# Patient Record
Sex: FEMALE | Race: WHITE | HISPANIC OR LATINO | URBAN - METROPOLITAN AREA
[De-identification: names, ages, dates, MRNs, and addresses within clinical notes are randomized per-mention and may not be internally consistent; named-entity substitution may affect disease eponyms.]

---

## 2018-03-27 NOTE — PATIENT DISCUSSION
POAG, OU:  ELEVATED INTRAOCULAR PRESSURE. PRESCRIBE _LATANOPROST QHS OU________. RETURN FOR FOLLOW-UP AS SCHEDULED.

## 2019-04-23 NOTE — PATIENT DISCUSSION
Glaucoma / iStent Counseling:  I have explained to the patient at length the diagnosis of primary open angle glaucoma and its pathophysiology. I have discussed the various treatment options including medications and surgery. I have discussed the option of iStent micro-invasive glaucoma surgery device at the time of cataract surgery should visual field testing indicate field loss. I have discussed the risks and benefits associated with iStent insertion at the time of cataract surgery. Patient understands and wishes to have an Istent inserted to try and get off drops secondary to poor compliance, cost and/or reaction to drops.

## 2019-04-23 NOTE — PATIENT DISCUSSION
POAG, OU: BORDERLINE INTRAOCULAR PRESSURE. STOP_LATANOPROST QHS OU AND START TIMOLOL QAM OU__. RETURN FOR FOLLOW-UP AS SCHEDULED.

## 2019-04-23 NOTE — PATIENT DISCUSSION
New Prescription: timolol maleate (timolol maleate): gel forming solution: 0.5% 1 drop every morning into both eyes 04-

## 2019-04-23 NOTE — PATIENT DISCUSSION
Primary Open Angle Glaucoma Counseling: I have explained to the patient at length regarding the diagnosis of primary open angle glaucoma and its pathophysiology. I have discussed the various treatment options including medications and surgery. I recommend that the patient begin medical treatment. I emphasized to the patient the importance of compliance with treatment and follow-up appointments.

## 2019-04-23 NOTE — PATIENT DISCUSSION
Stopped Today: latanoprost (latanoprost): drops: 0.005% 1 drop at bedtime as directed into both eyes 03-

## 2019-06-24 NOTE — PATIENT DISCUSSION
Continue: timolol maleate (timolol maleate): gel forming solution: 0.5% 1 drop every morning into both eyes 04-

## 2019-06-24 NOTE — PATIENT DISCUSSION
CATARACTS, OU - VISUALLY SIGNIFICANT. SCHEDULE _OS_ FIRST THEN LATER IN _OD_ DISCUSSED OPTION OF __standard iol___VS __standard with lensx ou____. PATIENT UNDERSTANDS AND DESIRES _to think about and decide prior to surgery.  _.

## 2019-07-19 NOTE — PATIENT DISCUSSION
S/P PE IOL, OS. STABLE. CONTINUE DROPS AS DIRECTED. RETURN FOR FOLLOW-UP IN 7- 10 DAYS. Skin normal color for race, warm, dry and intact. No evidence of rash.

## 2019-07-29 NOTE — PATIENT DISCUSSION
S/P PE IOL, OS. DOING WELL. CONTINUE PRED-GATI-BROM IN THE SURGICAL EYE  FOR A TOTAL OF 3 WEEKS USE THEN DISCONTINUE. PATIENT DESIRES _STD______IOL FOR 2ND EYE. SCHEDULE CATARACT SURGERY.

## 2019-07-29 NOTE — PATIENT DISCUSSION
Pre-Op 2nd Eye Counseling: The patient has noticed an improvement in their visual symptoms in the operative eye. The patient complains of decreased vision in the fellow eye when __driving. It was explained to the patient that the decision to proceed with cataract surgery in the fellow eye is entirely a separate decision from the surgical eye. All of the same risks, benefits and alternatives are reviewed with the patient again. The patient does feel the vision in the non-operative eye is limiting their daily activities and elects to proceed with cataract surgery in the _right eye. . I have given the patient the prescribed regimen of  drops to use before and after cataract surgery. Patient to administer as directed.

## 2019-08-02 NOTE — PATIENT DISCUSSION
New Prescription: erythromycin (erythromycin): ointment: 5 mg/gram (0.5 %) a small amount once a day into right eye 08-

## 2019-08-05 NOTE — PATIENT DISCUSSION
Continue: erythromycin (erythromycin): ointment: 5 mg/gram (0.5 %) a small amount once a day into right eye 08-

## 2019-08-05 NOTE — PATIENT DISCUSSION
CORNEAL ABRASION: OD, RESOLVED, PRESCRIBE EMYCIN JAZMYN QHS. PT EDUCATION. RTO AS SCHEDULED. MONITOR.

## 2019-12-16 NOTE — PATIENT DISCUSSION
POAG, OU:  INTRAOCULAR PRESSURE IS WITHIN ACCEPTABLE LIMITS. PATIENT INSTRUCTED TO CONTINUE _LATANOPROST QHS OU_______ AND RETURN FOR FOLLOW-UP AS SCHEDULED.

## 2021-02-18 ENCOUNTER — IMPORTED ENCOUNTER (OUTPATIENT)
Dept: URBAN - METROPOLITAN AREA CLINIC 31 | Facility: CLINIC | Age: 26
End: 2021-02-18

## 2021-02-18 PROBLEM — H18.623: Noted: 2021-02-18

## 2021-02-18 PROCEDURE — 99204 OFFICE O/P NEW MOD 45 MIN: CPT

## 2021-02-18 PROCEDURE — 92025 CPTRIZED CORNEAL TOPOGRAPHY: CPT

## 2021-02-18 PROCEDURE — 92015 DETERMINE REFRACTIVE STATE: CPT

## 2021-02-18 NOTE — PATIENT DISCUSSION
Keratoconus can be caused by eye rubbing or can be inherited. Explained importance of no eye rubbing to prevent progression. Condition is worsening. Treatment options discussed include glasses contact lens  and collagen cross linking. David Done today OU. New Glasses rx given todayReturn for an appointment for cornea evaluation. with Dr. Tg Post. For possible collagen crosslinking. Return for an appointment with Dr. Colni Levi. for Contact Lens Fit to try prior to Consult w/ FEP.

## 2021-04-27 ENCOUNTER — IMPORTED ENCOUNTER (OUTPATIENT)
Dept: URBAN - METROPOLITAN AREA CLINIC 31 | Facility: CLINIC | Age: 26
End: 2021-04-27

## 2021-04-27 PROBLEM — H18.603: Noted: 2021-04-27

## 2021-04-27 PROBLEM — H18.621: Noted: 2021-04-27

## 2021-04-27 PROBLEM — H18.602: Noted: 2021-04-27

## 2021-04-27 PROBLEM — H18.623: Noted: 2021-04-27

## 2021-04-27 PROCEDURE — 92025 CPTRIZED CORNEAL TOPOGRAPHY: CPT

## 2021-04-27 PROCEDURE — 99213 OFFICE O/P EST LOW 20 MIN: CPT

## 2021-04-27 NOTE — PATIENT DISCUSSION
Keratoconus OS -  can be caused by eye rubbing or can be inherited. Explained importance of no eye rubbing to prevent progression.   Treatment options discussed

## 2021-04-27 NOTE — PATIENT DISCUSSION
Keratoconus can be caused by eye rubbing or can be inherited. Explained importance of no eye rubbing to prevent progression. Condition is worsening. Treatment options discussed include glasses contact lens  and collagen cross linking. Risks and benefits of procedure reviewed including infection and haze. Schedule CCLx OD/OS 1-2 months apartAllergies topical pataday or zerviate sample given

## 2021-05-21 ENCOUNTER — IMPORTED ENCOUNTER (OUTPATIENT)
Dept: URBAN - METROPOLITAN AREA CLINIC 31 | Facility: CLINIC | Age: 26
End: 2021-05-21

## 2021-05-21 PROBLEM — H18.621: Noted: 2021-05-21

## 2021-05-21 PROCEDURE — 92071 CONTACT LENS FITTING FOR TX: CPT

## 2021-05-21 NOTE — PATIENT DISCUSSION
Drops reviewed w/ Pt. & Copy of Drop instructions to Pt. Pt Has Prolensa Prednisolone and Moxifloxicin.

## 2021-05-21 NOTE — PATIENT DISCUSSION
1.  Collagen cross linking OD: procedure performed without difficulty. (For Keratoconus) BCL placed. Postop drops and instruction sheet reviewed with patient. Call with any problems. Postop visit in 4-7 days2. Drops reviewed w/ Pt. & Copy of Drop instructions to Pt. Pt Has Prolensa Prednisolone and Moxifloxicin.

## 2021-05-26 ENCOUNTER — IMPORTED ENCOUNTER (OUTPATIENT)
Dept: URBAN - METROPOLITAN AREA CLINIC 31 | Facility: CLINIC | Age: 26
End: 2021-05-26

## 2021-05-26 PROBLEM — Z98.89: Noted: 2021-05-26

## 2021-05-26 PROCEDURE — 99024 POSTOP FOLLOW-UP VISIT: CPT

## 2021-05-26 NOTE — PATIENT DISCUSSION
Post Operative: Doing well po drops as instructed. tears prn Call with any problems. DC BCL moxi and prolensa taper PRED to 2x/d. Return for an appointment in 3 weeks for post op exam. MRx and Topography. OD with Dr. Tg Post.

## 2021-06-16 ENCOUNTER — IMPORTED ENCOUNTER (OUTPATIENT)
Dept: URBAN - METROPOLITAN AREA CLINIC 31 | Facility: CLINIC | Age: 26
End: 2021-06-16

## 2021-06-16 PROCEDURE — 92025 CPTRIZED CORNEAL TOPOGRAPHY: CPT

## 2021-06-16 PROCEDURE — 99024 POSTOP FOLLOW-UP VISIT: CPT

## 2021-09-29 ENCOUNTER — IMPORTED ENCOUNTER (OUTPATIENT)
Dept: URBAN - METROPOLITAN AREA CLINIC 31 | Facility: CLINIC | Age: 26
End: 2021-09-29

## 2021-09-29 PROBLEM — H18.601: Noted: 2021-09-29

## 2021-09-29 PROBLEM — H18.622: Noted: 2021-09-29

## 2021-09-29 PROCEDURE — 99213 OFFICE O/P EST LOW 20 MIN: CPT

## 2021-09-29 PROCEDURE — 92025 CPTRIZED CORNEAL TOPOGRAPHY: CPT

## 2021-09-29 PROCEDURE — 92015 DETERMINE REFRACTIVE STATE: CPT

## 2021-09-29 NOTE — PATIENT DISCUSSION
Keratoconus can be caused by eye rubbing or can be inherited. Explained importance of no eye rubbing to prevent progression. Condition is worsening os. Treatment options discussed include glasses contact lens  and collagen cross linking. Risks and benefits of procedure reviewed including infection and haze.  Schedule CCLx OS once fit with CL OD

## 2021-09-29 NOTE — PATIENT DISCUSSION
1. Keratoconus OD -  can be caused by eye rubbing or can be inherited. Explained importance of no eye rubbing to prevent progression. s/p CCLx improving option of glasses update of CL to correct residual refractive error. Pt would like to try CL schedule fit with Dr Daryn Way. 2. Keratoconus can be caused by eye rubbing or can be inherited. Explained importance of no eye rubbing to prevent progression. Condition is worsening os. Treatment options discussed include glasses contact lens  and collagen cross linking. Risks and benefits of procedure reviewed including infection and haze.  Schedule CCLx OS once fit with CL OD

## 2021-10-14 PROBLEM — Z98.89: Noted: 2021-10-14

## 2021-10-26 ENCOUNTER — IMPORTED ENCOUNTER (OUTPATIENT)
Dept: URBAN - METROPOLITAN AREA CLINIC 31 | Facility: CLINIC | Age: 26
End: 2021-10-26

## 2021-10-26 PROBLEM — H18.621: Noted: 2021-10-26

## 2021-10-26 PROBLEM — H18.601: Noted: 2021-10-26

## 2021-10-26 PROCEDURE — 92072 FITG C-LENS KERATOCONUS 1ST: CPT

## 2021-10-26 NOTE — PATIENT DISCUSSION
Keratoconus OD -  can be caused by eye rubbing or can be inherited. Explained importance of no eye rubbing to prevent progression.   Treatment options discussed

## 2021-10-26 NOTE — PATIENT DISCUSSION
1.  Collagen cross linking procedure performed without difficulty. BCL placed. Postop drops and instruction sheet reviewed with patient. Call with any problems. Postop visit in 4-7 days2. Keratoconus OD -  can be caused by eye rubbing or can be inherited. Explained importance of no eye rubbing to prevent progression. Treatment options discussedOrder trial/final lens OD. Call when in.

## 2021-12-08 ENCOUNTER — IMPORTED ENCOUNTER (OUTPATIENT)
Dept: URBAN - METROPOLITAN AREA CLINIC 31 | Facility: CLINIC | Age: 26
End: 2021-12-08

## 2021-12-08 PROBLEM — H18.601: Noted: 2021-12-08

## 2021-12-08 PROBLEM — H18.621: Noted: 2021-12-08

## 2021-12-08 PROCEDURE — 92072 FITG C-LENS KERATOCONUS 1ST: CPT

## 2021-12-08 NOTE — PATIENT DISCUSSION
Pt did I&R today will wear lens for 1-2 weeks and return for CL check. We can do over MRX at that time to see if we need to do any power change.

## 2021-12-15 ENCOUNTER — IMPORTED ENCOUNTER (OUTPATIENT)
Dept: URBAN - METROPOLITAN AREA CLINIC 31 | Facility: CLINIC | Age: 26
End: 2021-12-15

## 2021-12-15 PROBLEM — H18.601: Noted: 2021-12-15

## 2022-01-05 ENCOUNTER — IMPORTED ENCOUNTER (OUTPATIENT)
Dept: URBAN - METROPOLITAN AREA CLINIC 31 | Facility: CLINIC | Age: 27
End: 2022-01-05

## 2022-01-05 PROBLEM — H18.622: Noted: 2022-01-05

## 2022-01-05 PROCEDURE — 92071 CONTACT LENS FITTING FOR TX: CPT

## 2022-01-05 NOTE — PATIENT DISCUSSION
Collagen cross linking procedure performed OS without difficulty. BCL placed. Postop drops and instruction sheet reviewed with patient. Call with any problems.   Postop visit in 4-7 days

## 2022-01-10 ENCOUNTER — IMPORTED ENCOUNTER (OUTPATIENT)
Dept: URBAN - METROPOLITAN AREA CLINIC 31 | Facility: CLINIC | Age: 27
End: 2022-01-10

## 2022-01-10 PROBLEM — Z98.89: Noted: 2022-01-10

## 2022-01-10 PROCEDURE — 99024 POSTOP FOLLOW-UP VISIT: CPT

## 2022-01-10 NOTE — PATIENT DISCUSSION
1.  Post Operative s/p 1/5/2022  [facility] Cross Link OS  (-5 DAYS POST OP): Doing well po drops as instructed tears prn. Call with any problems. 2. Return for an appointment in 3 weeks for post op exam. with Dr. Jorge A Chang.

## 2022-02-04 ENCOUNTER — IMPORTED ENCOUNTER (OUTPATIENT)
Dept: URBAN - METROPOLITAN AREA CLINIC 31 | Facility: CLINIC | Age: 27
End: 2022-02-04

## 2022-02-04 PROBLEM — Z98.89: Noted: 2022-02-04

## 2022-02-04 PROCEDURE — 99024 POSTOP FOLLOW-UP VISIT: CPT

## 2022-02-04 NOTE — PATIENT DISCUSSION
Post Operative:  Doing well po drops as instructed tears prn. Call with any problems. PRED 2x/d for 2 weeks then qd. Return for an appointment in 6 weeks for post op exam. MRx and Topography. with Dr. Cori Silverman.

## 2022-04-02 ASSESSMENT — VISUAL ACUITY
OD_SC: 20/60
OU_SC: 20/20-1
OD_SC: 20/50-1
OD_PH: CC 20/40
OD_PH: CC 20/40 -2
OD_SC: 20/30+3
OD_SC: 20/60
OS_CC: 20/50
OS_SC: 20/30-1
OD_SC: 20/60-2
OD_PH: CC 20/30 -1
OD_PH: CC 20/30 +1
OS_PH: SC 20/25
OD_SC: 20/25
OD_CC: 20/50+2
OS_PH: CC 20/25 -2
OS_SC: 20/30-1
OD_CC: 20/60
OU_SC: 20/25-1
OS_SC: 20/25-1
OD_SC: 20/40
OD_PH: 20/25 -3
OD_CC: 20/200
OD_PH: CC 20/50
OS_SC: 20/30-3
OS_PH: 20/40 +1
OS_SC: 20/25-1
OU_CC: 20/30
OS_SC: 20/40
OD_PH: SC 20/30
OD_SC: 20/60+1
OS_CC: 20/80-1
OS_SC: 20/40+2
OS_CC: 20/50-1

## 2022-04-02 ASSESSMENT — TONOMETRY
OS_IOP_MMHG: 13
OD_IOP_MMHG: 14
OS_IOP_MMHG: 12
OD_IOP_MMHG: 14

## 2022-04-04 ENCOUNTER — ESTABLISHED PATIENT (OUTPATIENT)
Dept: URBAN - METROPOLITAN AREA CLINIC 31 | Facility: CLINIC | Age: 27
End: 2022-04-04

## 2022-04-04 DIAGNOSIS — H18.619: ICD-10-CM

## 2022-04-04 PROCEDURE — 92072 FITG C-LENS KERATOCONUS 1ST: CPT

## 2022-04-04 ASSESSMENT — VISUAL ACUITY
OD_PH: 20/25-1
OS_PH: 20/25
OD_CC: 20/25
OD_SC: 20/80-2
OS_SC: 20/40

## 2022-04-18 ENCOUNTER — ESTABLISHED PATIENT (OUTPATIENT)
Dept: URBAN - METROPOLITAN AREA CLINIC 31 | Facility: CLINIC | Age: 27
End: 2022-04-18

## 2022-04-18 DIAGNOSIS — Z46.0: ICD-10-CM

## 2022-04-18 DIAGNOSIS — H18.613: ICD-10-CM

## 2022-04-18 PROCEDURE — 92072 FITG C-LENS KERATOCONUS 1ST: CPT

## 2022-04-18 ASSESSMENT — VISUAL ACUITY
OS_CC: 20/25
OD_CC: 20/25

## 2022-05-18 ENCOUNTER — CONTACT LENSES/GLASSES VISIT (OUTPATIENT)
Dept: URBAN - METROPOLITAN AREA CLINIC 31 | Facility: CLINIC | Age: 27
End: 2022-05-18

## 2022-05-18 DIAGNOSIS — Z46.0: ICD-10-CM

## 2022-05-18 PROCEDURE — 92310F

## 2022-05-18 ASSESSMENT — VISUAL ACUITY
OS_CC: 20/30
OD_CC: 20/40

## 2022-05-18 NOTE — PATIENT DISCUSSION
Post Operative:  Doing well po drops as instructed tears prn. Call with any problems. PRED 2x/d for 2 weeks then qd. Return for an appointment in 6 weeks for post op exam. MRx and Topography. with Dr. Kyle Silva.

## 2022-06-07 ENCOUNTER — CONTACT LENSES/GLASSES VISIT (OUTPATIENT)
Dept: URBAN - METROPOLITAN AREA CLINIC 31 | Facility: CLINIC | Age: 27
End: 2022-06-07

## 2022-06-07 DIAGNOSIS — Z46.0: ICD-10-CM

## 2022-06-07 PROCEDURE — 92310-1 LEVEL 1 CONTACT LENS MANAGEMENT

## 2022-06-07 ASSESSMENT — VISUAL ACUITY
OS_CC: 20/25+3
OD_CC: 20/40

## 2022-08-15 NOTE — PATIENT DISCUSSION
Post Operative:  Doing well po drops as instructed tears prn. Call with any problems. PRED 2x/d for 2 weeks then qd. Return for an appointment in 6 weeks for post op exam. MRx and Topography. with Dr. Desmond Warner. [employer]

## 2024-01-26 ENCOUNTER — COMPREHENSIVE EXAM (OUTPATIENT)
Dept: URBAN - METROPOLITAN AREA CLINIC 31 | Facility: CLINIC | Age: 29
End: 2024-01-26

## 2024-01-26 DIAGNOSIS — H52.13: ICD-10-CM

## 2024-01-26 PROCEDURE — 92014CFS ESTABLISHED PT, EMPLOYEE ROUTINE COMP EXAM

## 2024-01-26 ASSESSMENT — TONOMETRY
OD_IOP_MMHG: 14
OS_IOP_MMHG: 14

## 2024-01-26 ASSESSMENT — VISUAL ACUITY
OU_SC: 20/60
OD_SC: 20/200
OS_SC: 20/80

## 2025-01-27 ENCOUNTER — COMPREHENSIVE EXAM (OUTPATIENT)
Age: 30
End: 2025-01-27

## 2025-01-27 DIAGNOSIS — H18.603: ICD-10-CM

## 2025-01-27 DIAGNOSIS — H52.13: ICD-10-CM

## 2025-01-27 PROCEDURE — 92014 COMPRE OPH EXAM EST PT 1/>: CPT

## 2025-01-27 PROCEDURE — 92015 DETERMINE REFRACTIVE STATE: CPT
